# Patient Record
Sex: FEMALE | Race: WHITE | NOT HISPANIC OR LATINO | Employment: FULL TIME | URBAN - METROPOLITAN AREA
[De-identification: names, ages, dates, MRNs, and addresses within clinical notes are randomized per-mention and may not be internally consistent; named-entity substitution may affect disease eponyms.]

---

## 2018-02-21 ENCOUNTER — TRANSCRIBE ORDERS (OUTPATIENT)
Dept: MAMMOGRAPHY | Facility: HOSPITAL | Age: 48
End: 2018-02-21

## 2018-02-21 DIAGNOSIS — Z12.31 VISIT FOR SCREENING MAMMOGRAM: Primary | ICD-10-CM

## 2018-03-14 ENCOUNTER — APPOINTMENT (OUTPATIENT)
Dept: MAMMOGRAPHY | Facility: HOSPITAL | Age: 48
End: 2018-03-14

## 2018-03-14 ENCOUNTER — HOSPITAL ENCOUNTER (OUTPATIENT)
Dept: MAMMOGRAPHY | Facility: HOSPITAL | Age: 48
Discharge: HOME | End: 2018-03-14
Admitting: PHYSICIAN ASSISTANT

## 2018-03-14 DIAGNOSIS — Z12.31 VISIT FOR SCREENING MAMMOGRAM: ICD-10-CM

## 2018-03-14 PROCEDURE — 77063 BREAST TOMOSYNTHESIS BI: CPT

## 2018-03-14 PROCEDURE — 77063 BREAST TOMOSYNTHESIS BI: CPT | Performed by: RADIOLOGY

## 2018-03-14 PROCEDURE — 77067 SCR MAMMO BI INCL CAD: CPT | Performed by: RADIOLOGY

## 2018-03-14 PROCEDURE — 77067 SCR MAMMO BI INCL CAD: CPT

## 2018-03-28 ENCOUNTER — APPOINTMENT (OUTPATIENT)
Dept: MAMMOGRAPHY | Facility: HOSPITAL | Age: 48
End: 2018-03-28

## 2019-03-26 ENCOUNTER — TRANSCRIBE ORDERS (OUTPATIENT)
Dept: ADMINISTRATIVE | Facility: HOSPITAL | Age: 49
End: 2019-03-26

## 2019-03-26 DIAGNOSIS — Z12.31 VISIT FOR SCREENING MAMMOGRAM: Primary | ICD-10-CM

## 2019-05-08 ENCOUNTER — HOSPITAL ENCOUNTER (OUTPATIENT)
Dept: MAMMOGRAPHY | Facility: HOSPITAL | Age: 49
Discharge: HOME | End: 2019-05-08
Admitting: PHYSICIAN ASSISTANT

## 2019-05-08 DIAGNOSIS — Z12.31 VISIT FOR SCREENING MAMMOGRAM: ICD-10-CM

## 2019-05-08 PROCEDURE — 77067 SCR MAMMO BI INCL CAD: CPT | Performed by: RADIOLOGY

## 2019-05-08 PROCEDURE — 77067 SCR MAMMO BI INCL CAD: CPT

## 2019-05-08 PROCEDURE — 77063 BREAST TOMOSYNTHESIS BI: CPT

## 2019-05-08 PROCEDURE — 77063 BREAST TOMOSYNTHESIS BI: CPT | Performed by: RADIOLOGY

## 2020-06-15 ENCOUNTER — TRANSCRIBE ORDERS (OUTPATIENT)
Dept: ADMINISTRATIVE | Facility: HOSPITAL | Age: 50
End: 2020-06-15

## 2020-06-15 DIAGNOSIS — Z12.31 VISIT FOR SCREENING MAMMOGRAM: Primary | ICD-10-CM

## 2020-06-27 ENCOUNTER — HOSPITAL ENCOUNTER (OUTPATIENT)
Dept: MAMMOGRAPHY | Facility: HOSPITAL | Age: 50
Discharge: HOME | End: 2020-06-27
Admitting: PHYSICIAN ASSISTANT

## 2020-06-27 DIAGNOSIS — Z12.31 VISIT FOR SCREENING MAMMOGRAM: ICD-10-CM

## 2020-06-27 PROCEDURE — 77067 SCR MAMMO BI INCL CAD: CPT

## 2020-06-27 PROCEDURE — 77063 BREAST TOMOSYNTHESIS BI: CPT | Performed by: RADIOLOGY

## 2020-06-27 PROCEDURE — 77067 SCR MAMMO BI INCL CAD: CPT | Performed by: RADIOLOGY

## 2020-06-27 PROCEDURE — 77063 BREAST TOMOSYNTHESIS BI: CPT

## 2021-07-26 ENCOUNTER — TRANSCRIBE ORDERS (OUTPATIENT)
Dept: ADMINISTRATIVE | Facility: HOSPITAL | Age: 51
End: 2021-07-26

## 2021-07-26 DIAGNOSIS — Z12.31 VISIT FOR SCREENING MAMMOGRAM: Primary | ICD-10-CM

## 2021-08-28 ENCOUNTER — HOSPITAL ENCOUNTER (OUTPATIENT)
Dept: MAMMOGRAPHY | Facility: HOSPITAL | Age: 51
Discharge: HOME | End: 2021-08-28
Admitting: NURSE PRACTITIONER

## 2021-08-28 DIAGNOSIS — Z12.31 VISIT FOR SCREENING MAMMOGRAM: ICD-10-CM

## 2021-08-28 PROCEDURE — 77063 BREAST TOMOSYNTHESIS BI: CPT | Performed by: RADIOLOGY

## 2021-08-28 PROCEDURE — 77063 BREAST TOMOSYNTHESIS BI: CPT

## 2021-08-28 PROCEDURE — 77067 SCR MAMMO BI INCL CAD: CPT

## 2021-08-28 PROCEDURE — 77067 SCR MAMMO BI INCL CAD: CPT | Performed by: RADIOLOGY

## 2022-04-14 ENCOUNTER — HOSPITAL ENCOUNTER (OUTPATIENT)
Dept: HOSPITAL 22 - PT.CARL | Age: 52
LOS: 21 days | Discharge: HOME | End: 2022-05-05
Payer: MEDICAID

## 2022-04-14 DIAGNOSIS — M65.812: Primary | ICD-10-CM

## 2022-04-14 PROCEDURE — 20560 NDL INSJ W/O NJX 1 OR 2 MUSC: CPT

## 2022-04-14 PROCEDURE — 97010 HOT OR COLD PACKS THERAPY: CPT

## 2022-04-14 PROCEDURE — 97110 THERAPEUTIC EXERCISES: CPT

## 2022-04-14 PROCEDURE — 97035 APP MDLTY 1+ULTRASOUND EA 15: CPT

## 2022-04-14 PROCEDURE — G0283 ELEC STIM OTHER THAN WOUND: HCPCS

## 2022-04-14 PROCEDURE — 97033 APP MDLTY 1+IONTPHRSIS EA 15: CPT

## 2022-04-14 PROCEDURE — 97163 PT EVAL HIGH COMPLEX 45 MIN: CPT

## 2022-04-14 PROCEDURE — 97014 ELECTRIC STIMULATION THERAPY: CPT

## 2022-08-02 ENCOUNTER — HOSPITAL ENCOUNTER (OUTPATIENT)
Dept: HOSPITAL 22 - OT | Age: 52
Discharge: HOME | End: 2022-08-02
Payer: MEDICAID

## 2022-08-02 DIAGNOSIS — M75.102: ICD-10-CM

## 2022-08-02 PROCEDURE — 97014 ELECTRIC STIMULATION THERAPY: CPT

## 2022-08-02 PROCEDURE — 97035 APP MDLTY 1+ULTRASOUND EA 15: CPT

## 2022-08-02 PROCEDURE — 97140 MANUAL THERAPY 1/> REGIONS: CPT

## 2022-08-02 PROCEDURE — 97165 OT EVAL LOW COMPLEX 30 MIN: CPT

## 2022-08-02 PROCEDURE — 97164 PT RE-EVAL EST PLAN CARE: CPT

## 2022-08-02 PROCEDURE — 97530 THERAPEUTIC ACTIVITIES: CPT

## 2022-08-02 PROCEDURE — G0283 ELEC STIM OTHER THAN WOUND: HCPCS

## 2022-08-02 PROCEDURE — 97010 HOT OR COLD PACKS THERAPY: CPT

## 2022-08-02 PROCEDURE — 97110 THERAPEUTIC EXERCISES: CPT

## 2022-10-11 ENCOUNTER — TRANSCRIBE ORDERS (OUTPATIENT)
Dept: ADMINISTRATIVE | Facility: HOSPITAL | Age: 52
End: 2022-10-11

## 2022-10-11 DIAGNOSIS — Z12.31 VISIT FOR SCREENING MAMMOGRAM: Primary | ICD-10-CM

## 2022-11-01 ENCOUNTER — HOSPITAL ENCOUNTER (OUTPATIENT)
Dept: HOSPITAL 22 - PT.CARL | Age: 52
Discharge: HOME | End: 2022-11-01
Payer: MEDICAID

## 2022-11-01 DIAGNOSIS — Z98.1: ICD-10-CM

## 2022-11-01 DIAGNOSIS — M75.122: Primary | ICD-10-CM

## 2022-11-01 PROCEDURE — 97163 PT EVAL HIGH COMPLEX 45 MIN: CPT

## 2022-11-01 PROCEDURE — 97164 PT RE-EVAL EST PLAN CARE: CPT

## 2022-11-01 PROCEDURE — 97035 APP MDLTY 1+ULTRASOUND EA 15: CPT

## 2022-11-01 PROCEDURE — 97033 APP MDLTY 1+IONTPHRSIS EA 15: CPT

## 2022-11-01 PROCEDURE — 97110 THERAPEUTIC EXERCISES: CPT

## 2022-11-01 PROCEDURE — 97014 ELECTRIC STIMULATION THERAPY: CPT

## 2022-11-01 PROCEDURE — 97140 MANUAL THERAPY 1/> REGIONS: CPT

## 2022-11-01 PROCEDURE — 97010 HOT OR COLD PACKS THERAPY: CPT

## 2022-11-01 PROCEDURE — G0283 ELEC STIM OTHER THAN WOUND: HCPCS

## 2022-11-05 ENCOUNTER — HOSPITAL ENCOUNTER (OUTPATIENT)
Dept: MAMMOGRAPHY | Facility: HOSPITAL | Age: 52
Discharge: HOME | End: 2022-11-05
Admitting: NURSE PRACTITIONER

## 2022-11-05 DIAGNOSIS — Z12.31 VISIT FOR SCREENING MAMMOGRAM: ICD-10-CM

## 2022-11-05 PROCEDURE — 77067 SCR MAMMO BI INCL CAD: CPT

## 2022-11-05 PROCEDURE — 77063 BREAST TOMOSYNTHESIS BI: CPT | Performed by: RADIOLOGY

## 2022-11-05 PROCEDURE — 77063 BREAST TOMOSYNTHESIS BI: CPT

## 2022-11-05 PROCEDURE — 77067 SCR MAMMO BI INCL CAD: CPT | Performed by: RADIOLOGY

## 2023-12-04 ENCOUNTER — TRANSCRIBE ORDERS (OUTPATIENT)
Dept: ADMINISTRATIVE | Facility: HOSPITAL | Age: 53
End: 2023-12-04
Payer: COMMERCIAL

## 2023-12-04 DIAGNOSIS — Z12.31 VISIT FOR SCREENING MAMMOGRAM: Primary | ICD-10-CM

## 2024-02-10 ENCOUNTER — HOSPITAL ENCOUNTER (OUTPATIENT)
Dept: MAMMOGRAPHY | Facility: HOSPITAL | Age: 54
Discharge: HOME | End: 2024-02-10
Admitting: NURSE PRACTITIONER
Payer: COMMERCIAL

## 2024-02-10 DIAGNOSIS — Z12.31 VISIT FOR SCREENING MAMMOGRAM: ICD-10-CM

## 2024-02-10 PROCEDURE — 77067 SCR MAMMO BI INCL CAD: CPT

## 2024-02-10 PROCEDURE — 77063 BREAST TOMOSYNTHESIS BI: CPT

## 2024-05-01 ENCOUNTER — HOSPITAL ENCOUNTER (OUTPATIENT)
Dept: HOSPITAL 22 - PT | Age: 54
LOS: 20 days | Discharge: HOME | End: 2024-05-21
Payer: MEDICAID

## 2024-05-01 DIAGNOSIS — M70.62: Primary | ICD-10-CM

## 2024-05-01 PROCEDURE — 97010 HOT OR COLD PACKS THERAPY: CPT

## 2024-05-01 PROCEDURE — 97110 THERAPEUTIC EXERCISES: CPT

## 2024-05-01 PROCEDURE — 97014 ELECTRIC STIMULATION THERAPY: CPT

## 2024-05-01 PROCEDURE — 97163 PT EVAL HIGH COMPLEX 45 MIN: CPT

## 2024-05-01 PROCEDURE — 97035 APP MDLTY 1+ULTRASOUND EA 15: CPT

## 2024-05-01 PROCEDURE — G0283 ELEC STIM OTHER THAN WOUND: HCPCS

## 2024-05-01 PROCEDURE — 20560 NDL INSJ W/O NJX 1 OR 2 MUSC: CPT

## 2024-05-01 PROCEDURE — 97140 MANUAL THERAPY 1/> REGIONS: CPT

## 2024-12-27 ENCOUNTER — TRANSCRIBE ORDERS (OUTPATIENT)
Dept: ADMINISTRATIVE | Facility: HOSPITAL | Age: 54
End: 2024-12-27
Payer: COMMERCIAL

## 2024-12-27 DIAGNOSIS — Z12.31 VISIT FOR SCREENING MAMMOGRAM: Primary | ICD-10-CM

## 2025-01-27 DIAGNOSIS — Z13.79 GENETIC TESTING: Primary | ICD-10-CM

## 2025-01-27 LAB
NCCN CRITERIA FLAG: ABNORMAL
TYRER CUZICK SCORE: 13.6

## 2025-01-27 NOTE — PROGRESS NOTES
Order received from provider for genetic testing, [employer] message sent to patient with all pertinent information and lab locations

## 2025-01-27 NOTE — PROGRESS NOTES
The patient would like to proceed with genetic testing.  I will submit an order for approval from her provider and coordinate care.

## 2025-01-27 NOTE — PROGRESS NOTES
This patient recently completed the CARE risk assessment for a mammogram appointment. Based on the patient's responses, NCCN criteria for genetic testing was met.     Navigator follow-up:   I sent the patient a [employer] message asking for a call to discuss assessment results.

## 2025-03-08 ENCOUNTER — HOSPITAL ENCOUNTER (OUTPATIENT)
Dept: MAMMOGRAPHY | Facility: HOSPITAL | Age: 55
Discharge: HOME | End: 2025-03-08
Admitting: NURSE PRACTITIONER
Payer: COMMERCIAL

## 2025-03-08 DIAGNOSIS — Z12.31 VISIT FOR SCREENING MAMMOGRAM: ICD-10-CM

## 2025-03-08 PROCEDURE — 77067 SCR MAMMO BI INCL CAD: CPT

## 2025-03-08 PROCEDURE — 77063 BREAST TOMOSYNTHESIS BI: CPT

## 2025-07-30 ENCOUNTER — OFFICE VISIT (OUTPATIENT)
Age: 55
End: 2025-07-30
Payer: COMMERCIAL

## 2025-07-30 ENCOUNTER — TELEPHONE (OUTPATIENT)
Age: 55
End: 2025-07-30

## 2025-07-30 VITALS
SYSTOLIC BLOOD PRESSURE: 116 MMHG | BODY MASS INDEX: 35.16 KG/M2 | HEIGHT: 65 IN | DIASTOLIC BLOOD PRESSURE: 78 MMHG | HEART RATE: 72 BPM | WEIGHT: 211 LBS | TEMPERATURE: 97.6 F

## 2025-07-30 DIAGNOSIS — I73.00 RAYNAUD'S PHENOMENON WITHOUT GANGRENE: ICD-10-CM

## 2025-07-30 DIAGNOSIS — R76.8 ANA POSITIVE: Primary | ICD-10-CM

## 2025-07-30 DIAGNOSIS — Z79.1 NSAID LONG-TERM USE: ICD-10-CM

## 2025-07-30 DIAGNOSIS — M25.50 ARTHRALGIA OF MULTIPLE SITES: ICD-10-CM

## 2025-07-30 DIAGNOSIS — M15.9 GENERALIZED OSTEOARTHROSIS, INVOLVING MULTIPLE SITES: ICD-10-CM

## 2025-07-30 RX ORDER — PANTOPRAZOLE SODIUM 20 MG/1
TABLET, DELAYED RELEASE ORAL
COMMUNITY

## 2025-07-30 RX ORDER — IBUPROFEN 200 MG
200 TABLET ORAL EVERY 6 HOURS PRN
COMMUNITY
End: 2025-07-30

## 2025-07-30 RX ORDER — FOLIC ACID 1 MG/1
1000 TABLET ORAL DAILY
COMMUNITY

## 2025-07-30 RX ORDER — CETIRIZINE HYDROCHLORIDE 10 MG/1
TABLET ORAL
COMMUNITY

## 2025-07-30 RX ORDER — LEVOTHYROXINE SODIUM 125 UG/1
TABLET ORAL
COMMUNITY
Start: 2025-03-05

## 2025-07-30 RX ORDER — ACETAMINOPHEN 500 MG
500 TABLET ORAL EVERY 6 HOURS PRN
COMMUNITY

## 2025-07-30 RX ORDER — SUMATRIPTAN 50 MG/1
TABLET, FILM COATED ORAL
COMMUNITY
Start: 2025-03-05

## 2025-07-30 RX ORDER — VALACYCLOVIR HYDROCHLORIDE 1 G/1
TABLET, FILM COATED ORAL
COMMUNITY

## 2025-07-30 RX ORDER — HYDROCHLOROTHIAZIDE 25 MG/1
TABLET ORAL
COMMUNITY

## 2025-07-30 RX ORDER — ESTRADIOL 2 MG/1
2 TABLET ORAL DAILY
COMMUNITY

## 2025-07-30 RX ORDER — CELECOXIB 50 MG/1
50 CAPSULE ORAL 2 TIMES DAILY PRN
Qty: 60 CAPSULE | Refills: 5 | Status: SHIPPED | OUTPATIENT
Start: 2025-07-30

## 2025-07-30 RX ORDER — MONTELUKAST SODIUM 10 MG/1
TABLET ORAL
COMMUNITY

## 2025-07-30 RX ORDER — ONDANSETRON 8 MG/1
TABLET, ORALLY DISINTEGRATING ORAL
COMMUNITY

## 2025-07-30 RX ORDER — TOPIRAMATE 50 MG/1
TABLET, FILM COATED ORAL
COMMUNITY
Start: 2025-03-21

## 2025-07-30 RX ORDER — MECLIZINE HYDROCHLORIDE 25 MG/1
TABLET ORAL
COMMUNITY

## 2025-07-30 NOTE — PROGRESS NOTES
Office Visit       Date: 07/30/2025   Patient Name: Thalia Rios  MRN: [id number]  YOB: 1970    Referring Physician: Danieal Albarran APRN     Chief Complaint:   Chief Complaint   Patient presents with    Joint Pain    Abnormal Lab     Positive SHITAL       History of Present Illness: Thalia Rios is a 54 y.o. female with history hypothyroidism, obesity, edema, gastric sleeve 2022 who is here today consultation from PCP for joint pain with positive SHITAL.    She reports history of positive rheumatoid factor years ago when she saw her provider at [facility].  Most recent rheumatoid factor is negative      History of Present Illness  She has been experiencing joint pain, which was previously managed with Mobic. Over the past 1.5 to 2 years, she has noticed an increase in pain, particularly in her hips, knees, and ankles, which she attributes to aging. She also reports occasional swelling in her ankles and hips, and muscle soreness around her neck. She does not experience any lower back pain but reports soreness on the sides of her hips. She makes an effort to keep her muscles well-stretched.     X-rays have revealed some osteoarthritis in her joints. She experiences blue discoloration of her fingernails in cold weather consistent with Raynaud's. She reports no rashes or sores in her mouth or nose. She has developed small abscesses over the past few years, leading to a couple of root canals. She reports no history of seizures, blood clots, pleurisy, pericarditis, lung or heart inflammation, or psoriasis. She has been experiencing migraines for several years, which have worsened over the past 6 months, possibly due to weather changes. She reports no specific joint pain today. Her ankles swell a few times a week, for which she takes hydrochlorothiazide as needed, about once a week. She has noticed less foot pain since losing weight. She experiences frequent  popping in her ankles when she moves them, which she describes as relieving tension.     She has not had her knees x-rayed recently but underwent meniscus tear surgery about 30 years ago.     She recalls prior rheumatoid factor being positive years ago, but she was never prescribed a DMARD by her prior rheumatologist at [facility].  Repeat rheumatoid factor is negative. She has undergone bariatric surgery and lost significant weight, which has alleviated some of her symptoms. She discontinued meloxicam as she felt she could manage without it. She takes Motrin and Tylenol at least 3 days a week, which she finds helpful. She has had x-rays and MRIs of her rotator cuff following a complete repair, as well as x-rays of her left hip last summer. She received Kenalog injections and physical therapy for her hip, which were beneficial. She finds physical therapy and stretching exercises helpful when her pain intensifies.    She has a family history of lupus, hypothyroidism, cancer, rheumatoid arthritis, and autoimmune conditions.   Her son was diagnosed with Crohn's disease.    PAST SURGICAL HISTORY:  Complete rotator cuff repair  Meniscus tear surgery approximately 30 years ago  Bariatric surgery    FAMILY HISTORY  Her uncle has lupus. Her sister had hypothyroidism and cancer. Her brother has cancer, rheumatoid arthritis, and psoriasis. Her son was diagnosed with Crohn's disease.      Labs 1/13/2025: Normal CBC, normal CMP, hemoglobin A1c 5.4, rheumatoid factor 11.7, vitamin D 50.1, positive SHITAL direct with positive RNP antibody 1.0 (0-0.9), otherwise negative DONN panel, negative Martinez antibodies, negative Sjogren's antibodies, normal TSH, normal iron, normal ferritin    Subjective     Review of Systems: Review of Systems   Constitutional:  Positive for fatigue. Negative for chills, fever and unexpected weight loss.   HENT:  Negative for mouth sores, sinus pressure and sore throat.    Eyes:  Negative for  pain and redness.   Respiratory:  Negative for cough and shortness of breath.    Cardiovascular:  Negative for chest pain.   Gastrointestinal:  Positive for GERD. Negative for abdominal pain, blood in stool, diarrhea, nausea and vomiting.   Endocrine: Positive for cold intolerance. Negative for polydipsia and polyuria.   Genitourinary:  Negative for dysuria, genital sores and hematuria.   Musculoskeletal:  Positive for arthralgias and neck stiffness. Negative for back pain, joint swelling, myalgias and neck pain.   Skin:  Negative for rash and bruise.   Neurological:  Negative for seizures, weakness, numbness and memory problem.   Hematological:  Negative for adenopathy. Does not bruise/bleed easily.   Psychiatric/Behavioral:  Negative for depressed mood. The patient is not nervous/anxious.         Past Medical History:   Past Medical History:   Diagnosis Date    Arthritis     Hips and Knees    Breast injury 2019    HIT RT BREAST WITH SOFTBALL    GERD (gastroesophageal reflux disease)     Hypothyroidism     Migraines     Raynaud's disease        Past Surgical History:   Past Surgical History:   Procedure Laterality Date     SECTION      GASTRIC SLEEVE LAPAROSCOPIC          HYSTERECTOMY      KNEE ARTHROSCOPY      LAPAROSCOPIC GASTRIC BANDING      LAPAROSCOPIC TUBAL LIGATION      OOPHORECTOMY      REDUCTION MAMMAPLASTY Bilateral     ROTATOR CUFF REPAIR Left         TONSILLECTOMY AND ADENOIDECTOMY         Family History:   Family History   Problem Relation Age of Onset    Hypertension Mother     Hyperlipidemia Mother     Hypothyroidism Mother     Hypertension Father     Hyperlipidemia Father     Thyroid cancer Sister     Hyperthyroidism Sister     Leukemia Sister     Melanoma Sister     Diabetes Brother     Hypertension Brother     Hyperlipidemia Brother     Kidney cancer Brother     Psoriasis Brother     Rheum arthritis Brother     Crohn's disease Child     Breast cancer Maternal Aunt 46     Lupus Paternal Uncle     Ovarian cancer Neg Hx        Social History:   Social History     Socioeconomic History    Marital status: Single   Tobacco Use    Smoking status: Never     Passive exposure: Never    Smokeless tobacco: Never   Substance and Sexual Activity    Alcohol use: Yes     Comment: Rarely    Drug use: Never    Sexual activity: Defer       Medications:   Current Outpatient Medications:     acetaminophen (TYLENOL) 500 MG tablet, Take 1 tablet by mouth Every 6 (Six) Hours As Needed for Mild Pain., Disp: , Rfl:     cetirizine (zyrTEC) 10 MG tablet, TAKE 1 TABLET 1 TIME EACH DAY, Disp: , Rfl:     cholecalciferol (VITAMIN D3) 1.25 MG (32230 UT) capsule, TAKE ONE CAPSULE ONE time each WEEK, Disp: , Rfl:     estradiol (ESTRACE) 2 MG tablet, Take 1 tablet by mouth Daily., Disp: , Rfl:     folic acid (FOLVITE) 1 MG tablet, Take 1 tablet by mouth Daily., Disp: , Rfl:     hydroCHLOROthiazide 25 MG tablet, TAKE 1 TABLET 1 TIME EACH DAY AS NEEDED, Disp: , Rfl:     levothyroxine (SYNTHROID, LEVOTHROID) 125 MCG tablet, Take 1 tablet every day by oral route as directed., Disp: , Rfl:     meclizine (ANTIVERT) 25 MG tablet, TAKE 1 TABLET 3 TIMES EACH DAY AS NEEDED, Disp: , Rfl:     montelukast (SINGULAIR) 10 MG tablet, TAKE 1 TABLET 1 TIME EACH DAY IN THE EVENING, Disp: , Rfl:     ondansetron ODT (ZOFRAN-ODT) 8 MG disintegrating tablet, Place 1 tablet every 12 hours by translingual route as needed., Disp: , Rfl:     pantoprazole (PROTONIX) 20 MG EC tablet, TAKE ONE TABLET BY MOUTH TWICE DAILY as directed, Disp: , Rfl:     SUMAtriptan (IMITREX) 50 MG tablet, Take 1 tablet by mouth at onset of headache; May repeat dose once after at least 2 hours; May take up to 100mg/24 hours by mouth. (Max 4 tablets/24 hours), Disp: , Rfl:     topiramate (TOPAMAX) 50 MG tablet, Take 1 tablet twice a day by oral route as directed., Disp: , Rfl:     valACYclovir (VALTREX) 1000 MG tablet, TAKE ONE TABLET BY MOUTH EVERY TWELVE HOURS FOR  "ONE DAY, Disp: , Rfl:     celecoxib (CeleBREX) 50 MG capsule, Take 1 capsule by mouth 2 (Two) Times a Day As Needed for Mild Pain., Disp: 60 capsule, Rfl: 5    Allergies: Not on File      Objective      Vital Signs:   Vitals:    07/30/25 1256   BP: 116/78   BP Location: Left arm   Pulse: 72   Temp: 97.6 °F (36.4 °C)   Weight: 95.7 kg (211 lb)   Height: 165.1 cm (65\")   PainSc: 2    PainLoc: Hip  Comment: Hips,Ankles,Shoulders     Body mass index is 35.11 kg/m².       Physical Exam:  Physical Exam   MUSCULOSKELETAL:   No peripheral synovitis.  No tender peripheral joints.    No rheumatoid nodules or tophi.  No dactylitis.  No pitting of the nail  Scattered OA changes hands.  Some crepitus with range of motion knees  Tender points are generally negative.    Complete joint exam was performed including the MCPs, PIPs, DIPs of the hands, wrists, elbows, shoulders, hips, knees and ankles.  No soft tissue swelling or tenderness is present except as above.    General: The patient is well-developed and well nourished. Cooperative, alert and oriented. Affect is normal. Hydration appears normal.   HEENT: Normocephalic and atraumatic. Lids and conjunctiva are normal. Pupils are equal and sclera are clear. Oropharynx is clear   NECK neck is supple without adenopathy, masses or thyromegaly.   CARDIOVASCULAR: Regular rate and rhythm. No murmurs, rubs or gallops   LUNGS: Effort is normal. Lungs are clear bilateral   ABDOMEN: Not examined  EXTREMITIES: Peripheral pulses are intact. No clubbing.   SKIN: No rashes. No subcutaneous nodules. No digital ulcers. No sclerodactyly.   NEUROLOGIC: Gait is normal. Strength testing is normal.  No focal neurologic deficits    Results Review:   Labs:    No results found for: \"GLUCOSE\", \"BUN\", \"CREATININE\", \"EGFRRESULT\", \"EGFR\", \"BCR\", \"K\", \"CO2\", \"CALCIUM\", \"PROTENTOTREF\", \"ALBUMIN\", \"BILITOT\", \"AST\", \"ALT\"  No results found for: \"WBC\", \"HGB\", \"HCT\", \"MCV\", \"PLT\"  No results found for: " "\"SEDRATE\"  No results found for: \"CRP\"  No results found for: \"QUANTIFERO\", \"QUANTITB1\", \"QUANTITB2\", \"QUANTIFERN\", \"QUANTIFERM\", \"QUANTITBGLDP\"  No results found for: \"RF\"  No results found for: \"HEPBSAG\", \"HEPAIGM\", \"HEPBIGMCORE\", \"HEPCVIRUSABY\"        Procedures    Assessment / Plan      1. SHITAL positive    2. Raynaud's phenomenon without gangrene    3. Arthralgia of multiple sites    4. Generalized osteoarthrosis, involving multiple sites    5. NSAID long-term use          Assessment & Plan  - Positive SIHTAL.    Labs 1/13/2025: Normal CBC, normal CMP, hemoglobin A1c 5.4, rheumatoid factor 11.7, vitamin D 50.1, positive SHITAL direct with positive RNP antibody 1.0 (0-0.9), otherwise negative DONN panel, negative Martinez antibodies, negative Sjogren's antibodies, normal TSH, normal iron, normal ferritin    Her direct SHITAL test returned positive along with weakly positive RNP antibody.  However all specific markers for lupus, scleroderma, Sjogren's disease, and rheumatoid arthritis are negative  There are no swollen peripheral joints to suggest an inflammatory arthritis.    Presently suspect false positive SHITAL/weakly positive RNP antibody  Currently no strong clinical or lab findings consistent with an inflammatory rheumatic disease.    There are no clinical features of rheumatoid arthritis, psoriatic arthritis, scleroderma, vasculitis, lupus or connective tissue disease.  No Raynaud's, no malar rash, no oral/nasal ulcers, no pleurisy/pericarditis, no seizure disorder, no renal or hematologic abnormality.  No clotting disorder.  No photosensitivity.  No inflammatory arthritis    An SHITAL test is a nonspecific test.  While it certainly can be positive in conditions like SLE, scleroderma, myositis, Sjogren's, RA, vasculitis, etc., it can also be positive in patients with thyroid disease, type 1 diabetes, psoriasis, celiac disease, inflammatory bowel disease, COPD/chronic lung disease, cancers etc.  There are also reports of " normal/apparently healthy individuals who are incidentally found to have a positive SHITAL test.  You can also frequently get a false positive SHITAL test.  Positive SHITAL results increase with age.  15% of patients over the age of 65 are SHITAL positive, along with 5% of the general population.    Her present arthralgias are likely due to generalized osteoarthritis and not an inflammatory rheumatic disease.  She reports that her prior rheumatoid factor was positive years ago when she saw a provider at [facility] Dr. Cannon or Dr. Newsome.  However her most recent rheumatoid factor is negative and she has no peripheral synovitis to suggest RA.  She was never treated with a DMARD previously     Recommendations:    - Obtain x-rays hands feet knees today to rule out erosive disease  - Obtain further labs as below for continued investigation of positive SHITAL with arthralgias  - we discussed my present suspicion for false positive SHITAL/RNP findings.       - Arthralgias of multiple joints  - Generalized osteoarthritis  - Status post knee arthroscopy and rotator cuff repair    The joint pain is likely due to osteoarthritis, which can affect multiple joints and cause significant discomfort.   An NSAID would be appropriate for managing the pain, along with weight loss and a Mediterranean diet.   If ibuprofen is not sufficient, a different NSAID can be tried.   -Celebrex (celecoxib) is recommended as it is less likely to cause ulcerations than ibuprofen. She should not take both NSAIDs concurrently.  A prescription for Celebrex (celecoxib) will be sent to replace the ibuprofen, to be taken once or twice daily as needed.    -Long-term NSAID use  Risks of NSAIDs discussed including GI upset, GI bleeding, renal or hepatic risks and the risk of cardiovascular disease and stroke.  Warned patient not to take other NSAIDs including over-the-counter NSAIDs    - Raynaud's  Recommend avoidance of cold, smoke, caffeine, stress which  can exacerbate Raynaud's  No digital ulcers      Orders Placed This Encounter   Procedures    XR Hand 2 View Bilateral    XR Foot 3+ View Bilateral    XR Knee 1 or 2 View Bilateral    SHITAL by IFA, Reflex 9-biomarkers profile    CBC Auto Differential    Comprehensive Metabolic Panel    C-reactive Protein    Sedimentation Rate    Uric Acid    Rheumatoid Factor    Cyclic Citrul Peptide Antibody, IgG / IgA    C4+C3    Beta-2 Glycoprotein Antibodies    Anticardiolipin Antibody, IgG / M, Qn    Lupus Anticoagulant Reflex    Protein / Creatinine Ratio, Urine - Urine, Clean Catch    UA / M With / Rflx Culture([facility] ONLY) - Urine, Clean Catch     New Medications Ordered This Visit   Medications    celecoxib (CeleBREX) 50 MG capsule     Sig: Take 1 capsule by mouth 2 (Two) Times a Day As Needed for Mild Pain.     Dispense:  60 capsule     Refill:  5      Overall low suspicion of systemic inflammatory rheumatic disease at this time.  As long as below rheumatologic labs ordered today come back unremarkable beyond positive SHITAL/RNP antibody, I will have the patient return on an as-needed basis and continue to follow with their primary provider.  Patient understands that should they have any rheumatologic concerns in the future to give us a call and I will be happy to re-evaluate.  It was a pleasure to see the patient in clinic today.  Thank you for allowing me to participate in the care of this patient    Follow Up:   Return if symptoms worsen or fail to improve.      Patient or patient representative verbalized consent for the use of Ambient Listening during the visit with  Adonis Cruz MD for chart documentation. 7/30/2025  16:51 EDT        Adonis Cruz MD  [facility] [place]

## 2025-07-30 NOTE — PATIENT INSTRUCTIONS
"Osteoarthritis    Osteoarthritis is a type of arthritis. It refers to joint pain or joint disease. Osteoarthritis affects tissue that covers the ends of bones in joints (cartilage). Cartilage acts as a cushion between the bones and helps them move smoothly. Osteoarthritis occurs when cartilage in the joints gets worn down. Osteoarthritis is sometimes called \"wear and tear\" arthritis.  Osteoarthritis is the most common form of arthritis. It often occurs in older people. It is a condition that gets worse over time. The joints most often affected by this condition are in the fingers, toes, hips, knees, and spine, including the neck and lower back.    What are the causes?  This condition is caused by the wearing down of cartilage that covers the ends of bones.    What increases the risk?  The following factors may make you more likely to develop this condition:  Being age 50 or older.  Obesity.  Overuse of joints.  Past injury of a joint.  Past surgery on a joint.  Family history of osteoarthritis.    What are the signs or symptoms?  The main symptoms of this condition are pain, swelling, and stiffness in the joint. Other symptoms may include:  An enlarged joint.  More pain and further damage caused by small pieces of bone or cartilage that break off and float inside of the joint.  Small deposits of bone (osteophytes) that grow on the edges of the joint.  A grating or scraping feeling inside the joint when you move it.  Popping or creaking sounds when you move.  Difficulty walking or exercising.  An inability to  items, twist your hand, or control the movements of your hands and fingers.    How is this diagnosed?  This condition may be diagnosed based on:  Your medical history.  A physical exam.  Your symptoms.  X-rays of the affected joints.  Blood tests to rule out other types of arthritis.    How is this treated?  There is no cure for this condition, but treatment can help control pain and improve joint function. " Treatment may include a combination of therapies, such as:  Pain relief techniques, such as:  Applying heat and cold to the joint.  Massage.  A form of talk therapy called cognitive behavioral therapy (CBT). This therapy helps you set goals and follow up on the changes that you make.  Medicines for pain and inflammation. The medicines can be taken by mouth or applied to the skin. They include:  NSAIDs, such as ibuprofen.  Prescription medicines.  Strong anti-inflammatory medicines (corticosteroids).  Certain nutritional supplements.  A prescribed exercise program. You may work with a physical therapist.  Assistive devices, such as a brace, wrap, splint, specialized glove, or cane.  A weight control plan.  Surgery, such as:  An osteotomy. This is done to reposition the bones and relieve pain or to remove loose pieces of bone and cartilage.  Joint replacement surgery. You may need this surgery if you have advanced osteoarthritis.    Follow these instructions at home:    Activity  Rest your affected joints as told by your health care provider.  Exercise as told by your provider. The provider may recommend specific types of exercise, such as:  Strengthening exercises. These are done to strengthen the muscles that support joints affected by arthritis.  Aerobic activities. These are exercises, such as brisk walking or water aerobics, that increase your heart rate.  Range-of-motion activities. These help your joints move more easily.  Balance and agility exercises.    Managing pain, stiffness, and swelling         If told, apply heat to the affected area as often as told by your provider. Use the heat source that your provider recommends, such as a moist heat pack or a heating pad.  If you have a removable assistive device, remove it as told by your provider.  Place a towel between your skin and the heat source. If your provider tells you to keep the assistive device on while you apply heat, place a towel between the  assistive device and the heat source.  Leave the heat on for 20-30 minutes.  If told, put ice on the affected area.  If you have a removable assistive device, remove it as told by your provider.  Put ice in a plastic bag.  Place a towel between your skin and the bag. If your provider tells you to keep the assistive device on during icing, place a towel between the assistive device and the bag.  Leave the ice on for 20 minutes, 2-3 times a day.  If your skin turns bright red, remove the ice or heat right away to prevent skin damage. The risk of damage is higher if you cannot feel pain, heat, or cold.  Move your fingers or toes often to reduce stiffness and swelling.  Raise (elevate) the affected area above the level of your heart while you are sitting or lying down.    General instructions  Take over-the-counter and prescription medicines only as told by your provider.  Maintain a healthy weight. Follow instructions from your provider for weight control.  Do not use any products that contain nicotine or tobacco. These products include cigarettes, chewing tobacco, and vaping devices, such as e-cigarettes. If you need help quitting, ask your provider.  Use assistive devices as told by your provider.    Where to find more information  [employer] [place] of Arthritis and Musculoskeletal and Skin Diseases: niams.nih.gov  [employer] [place] on Aging: jorge luis.nih.gov  American College of Rheumatology: rheumatology.org    Contact a health care provider if:  You have redness, swelling, or a feeling of warmth in a joint that gets worse.  You have a fever along with joint or muscle aches.  You develop a rash.  You have trouble doing your normal activities.  You have pain that gets worse and is not relieved by pain medicine.    This information is not intended to replace advice given to you by your health care provider. Make sure you discuss any questions you have with your health care provider.  Document Revised: 08/17/2023  Document Reviewed: 08/17/2023  [employer] Patient Education © 2024 [employer] Antinuclear Antibody Test    Why am I having this test?  This is a test that is used to help diagnose systemic lupus erythematosus (SLE) and other autoimmune diseases. An autoimmune disease is a disease in which the body's own defense system (immune system) attacks its organs.  What is being tested?  This test checks for antinuclear antibodies (SHITAL) in the blood. The presence of SHITAL is associated with several autoimmune diseases. It is seen in almost all people with lupus.  What kind of sample is taken?    A blood sample is required for this test. It is usually collected by inserting a needle into a blood vessel.  How are the results reported?  Your test results will be reported as either positive or negative.  What do the results mean?  A positive test result may mean that you have:  Lupus.  Other autoimmune diseases, such as rheumatoid arthritis, scleroderma, or Sjögren syndrome.  Talk with your health care provider about what your results mean. In some cases, your health care provider may do more testing to confirm the results. More testing may be done because other conditions can sometimes cause a positive result, such as:  Liver dysfunction.  Myasthenia gravis.  Infectious mononucleosis.  Questions to ask your health care provider  Ask your health care provider, or the department that is doing the test:  When will my results be ready?  How will I get my results?  What are my treatment options?  What other tests do I need?  What are my next steps?  Summary  This is a test that is used to help diagnose systemic lupus erythematosus (SLE) and other autoimmune diseases. An autoimmune disease is a disease in which the body's own defense system (immune system) attacks the body.  This test checks for antinuclear antibodies (SHITAL) in the blood. The presence of SHITAL is associated with several autoimmune diseases. It is seen in almost all people  with lupus.  Your test results will be reported as either positive or negative. Talk with your health care provider about what your results mean.  This information is not intended to replace advice given to you by your health care provider. Make sure you discuss any questions you have with your health care provider.  Document Revised: 08/21/2022 Document Reviewed: 08/21/2022  [employer] Patient Education © 2024 [employer] Antinuclear Antibody Test    Why am I having this test?  This is a test that is used to help diagnose systemic lupus erythematosus (SLE) and other autoimmune diseases. An autoimmune disease is a disease in which the body's own defense system (immune system) attacks its organs.  What is being tested?  This test checks for antinuclear antibodies (SHITAL) in the blood. The presence of SHITAL is associated with several autoimmune diseases. It is seen in almost all people with lupus.  What kind of sample is taken?    A blood sample is required for this test. It is usually collected by inserting a needle into a blood vessel.  How are the results reported?  Your test results will be reported as either positive or negative.  What do the results mean?  A positive test result may mean that you have:  Lupus.  Other autoimmune diseases, such as rheumatoid arthritis, scleroderma, or Sjögren syndrome.  Talk with your health care provider about what your results mean. In some cases, your health care provider may do more testing to confirm the results. More testing may be done because other conditions can sometimes cause a positive result, such as:  Liver dysfunction.  Myasthenia gravis.  Infectious mononucleosis.  Questions to ask your health care provider  Ask your health care provider, or the department that is doing the test:  When will my results be ready?  How will I get my results?  What are my treatment options?  What other tests do I need?  What are my next steps?  Summary  This is a test that is used to help  diagnose systemic lupus erythematosus (SLE) and other autoimmune diseases. An autoimmune disease is a disease in which the body's own defense system (immune system) attacks the body.  This test checks for antinuclear antibodies (SHITAL) in the blood. The presence of SHITAL is associated with several autoimmune diseases. It is seen in almost all people with lupus.  Your test results will be reported as either positive or negative. Talk with your health care provider about what your results mean.  This information is not intended to replace advice given to you by your health care provider. Make sure you discuss any questions you have with your health care provider.  Document Revised: 08/21/2022 Document Reviewed: 08/21/2022  [employer] Patient Education © 2024 [employer]

## 2025-07-31 NOTE — TELEPHONE ENCOUNTER
I called [facility] and was transferred to [facility] in [facility].  Per her VM, I left request for x-rays/MRI/office notes from Dr. Arias. I left both our fax number and call back number. -VALE Awan

## 2025-08-01 LAB
ALBUMIN SERPL-MCNC: 4.6 G/DL (ref 3.8–4.9)
ALP SERPL-CCNC: 71 IU/L (ref 44–121)
ALT SERPL-CCNC: 12 IU/L (ref 0–32)
ANA SER QL IF: NEGATIVE
APPEARANCE UR: CLEAR
AST SERPL-CCNC: 14 IU/L (ref 0–40)
B2 GLYCOPROT1 IGA SER-ACNC: <9 GPI IGA UNITS (ref 0–25)
B2 GLYCOPROT1 IGG SER-ACNC: <9 GPI IGG UNITS (ref 0–20)
B2 GLYCOPROT1 IGM SER-ACNC: <9 GPI IGM UNITS (ref 0–32)
BACTERIA #/AREA URNS HPF: NORMAL /[HPF]
BACTERIA UR CULT: NORMAL
BACTERIA UR CULT: NORMAL
BASOPHILS # BLD AUTO: 0 X10E3/UL (ref 0–0.2)
BASOPHILS NFR BLD AUTO: 0 %
BILIRUB SERPL-MCNC: 0.3 MG/DL (ref 0–1.2)
BILIRUB UR QL STRIP: NEGATIVE
BUN SERPL-MCNC: 14 MG/DL (ref 6–24)
BUN/CREAT SERPL: 14 (ref 9–23)
C3 SERPL-MCNC: 123 MG/DL (ref 82–167)
C4 SERPL-MCNC: 21 MG/DL (ref 12–38)
CALCIUM SERPL-MCNC: 9.4 MG/DL (ref 8.7–10.2)
CARDIOLIPIN IGG SER IA-ACNC: <9 GPL U/ML (ref 0–14)
CARDIOLIPIN IGM SER IA-ACNC: <9 MPL U/ML (ref 0–12)
CASTS URNS QL MICRO: NORMAL /LPF
CCP IGA+IGG SERPL IA-ACNC: 3 UNITS (ref 0–19)
CHLORIDE SERPL-SCNC: 103 MMOL/L (ref 96–106)
CO2 SERPL-SCNC: 22 MMOL/L (ref 20–29)
COLOR UR: YELLOW
CREAT SERPL-MCNC: 1 MG/DL (ref 0.57–1)
CREAT UR-MCNC: 47.1 MG/DL
CRP SERPL-MCNC: <1 MG/L (ref 0–10)
EGFRCR SERPLBLD CKD-EPI 2021: 67 ML/MIN/1.73
EOSINOPHIL # BLD AUTO: 0.1 X10E3/UL (ref 0–0.4)
EOSINOPHIL NFR BLD AUTO: 1 %
EPI CELLS #/AREA URNS HPF: NORMAL /HPF (ref 0–10)
ERYTHROCYTE [DISTWIDTH] IN BLOOD BY AUTOMATED COUNT: 12.4 % (ref 11.7–15.4)
ERYTHROCYTE [SEDIMENTATION RATE] IN BLOOD BY WESTERGREN METHOD: 5 MM/HR (ref 0–40)
GLOBULIN SER CALC-MCNC: 2.7 G/DL (ref 1.5–4.5)
GLUCOSE SERPL-MCNC: 77 MG/DL (ref 70–99)
GLUCOSE UR QL STRIP: NEGATIVE
HCT VFR BLD AUTO: 42 % (ref 34–46.6)
HGB BLD-MCNC: 13.2 G/DL (ref 11.1–15.9)
HGB UR QL STRIP: NEGATIVE
IMM GRANULOCYTES # BLD AUTO: 0 X10E3/UL (ref 0–0.1)
IMM GRANULOCYTES NFR BLD AUTO: 0 %
KETONES UR QL STRIP: NEGATIVE
LA 2 SCREEN W REFLEX-IMP: NORMAL
LABORATORY COMMENT REPORT: NORMAL
LEUKOCYTE ESTERASE UR QL STRIP: NEGATIVE
LYMPHOCYTES # BLD AUTO: 2.2 X10E3/UL (ref 0.7–3.1)
LYMPHOCYTES NFR BLD AUTO: 41 %
MCH RBC QN AUTO: 27.6 PG (ref 26.6–33)
MCHC RBC AUTO-ENTMCNC: 31.4 G/DL (ref 31.5–35.7)
MCV RBC AUTO: 88 FL (ref 79–97)
MICRO URNS: NORMAL
MICRO URNS: NORMAL
MONOCYTES # BLD AUTO: 0.3 X10E3/UL (ref 0.1–0.9)
MONOCYTES NFR BLD AUTO: 6 %
MUCOUS THREADS URNS QL MICRO: PRESENT
NEUTROPHILS # BLD AUTO: 2.7 X10E3/UL (ref 1.4–7)
NEUTROPHILS NFR BLD AUTO: 52 %
NITRITE UR QL STRIP: NEGATIVE
PH UR STRIP: 7 [PH] (ref 5–7.5)
PLATELET # BLD AUTO: 249 X10E3/UL (ref 150–450)
POTASSIUM SERPL-SCNC: 4 MMOL/L (ref 3.5–5.2)
PROT SERPL-MCNC: 7.3 G/DL (ref 6–8.5)
PROT UR QL STRIP: NEGATIVE
PROT UR-MCNC: 4.9 MG/DL
PROT/CREAT UR: 104 MG/G CREAT (ref 0–200)
RBC # BLD AUTO: 4.78 X10E6/UL (ref 3.77–5.28)
RBC #/AREA URNS HPF: NORMAL /HPF (ref 0–2)
RHEUMATOID FACT SERPL-ACNC: <10 IU/ML
SCREEN APTT: 34.6 SEC (ref 0–43.5)
SCREEN DRVVT: 32.2 SEC (ref 0–47)
SODIUM SERPL-SCNC: 141 MMOL/L (ref 134–144)
SP GR UR STRIP: 1.01 (ref 1–1.03)
URATE SERPL-MCNC: 3.3 MG/DL (ref 3–7.2)
URINALYSIS REFLEX: NORMAL
UROBILINOGEN UR STRIP-MCNC: 0.2 MG/DL (ref 0.2–1)
WBC # BLD AUTO: 5.2 X10E3/UL (ref 3.4–10.8)
WBC #/AREA URNS HPF: NORMAL /HPF (ref 0–5)

## 2025-08-05 ENCOUNTER — PATIENT ROUNDING (BHMG ONLY) (OUTPATIENT)
Age: 55
End: 2025-08-05
Payer: COMMERCIAL